# Patient Record
Sex: MALE | Race: WHITE
[De-identification: names, ages, dates, MRNs, and addresses within clinical notes are randomized per-mention and may not be internally consistent; named-entity substitution may affect disease eponyms.]

---

## 2017-01-16 ENCOUNTER — HOSPITAL ENCOUNTER (EMERGENCY)
Dept: HOSPITAL 17 - PHED | Age: 42
Discharge: HOME | End: 2017-01-16
Payer: COMMERCIAL

## 2017-01-16 VITALS — HEIGHT: 76 IN | WEIGHT: 315 LBS | BODY MASS INDEX: 38.36 KG/M2

## 2017-01-16 VITALS
SYSTOLIC BLOOD PRESSURE: 197 MMHG | OXYGEN SATURATION: 96 % | HEART RATE: 79 BPM | RESPIRATION RATE: 18 BRPM | DIASTOLIC BLOOD PRESSURE: 98 MMHG

## 2017-01-16 VITALS — RESPIRATION RATE: 18 BRPM

## 2017-01-16 VITALS
RESPIRATION RATE: 18 BRPM | DIASTOLIC BLOOD PRESSURE: 125 MMHG | TEMPERATURE: 98.3 F | SYSTOLIC BLOOD PRESSURE: 187 MMHG | HEART RATE: 94 BPM | OXYGEN SATURATION: 98 %

## 2017-01-16 DIAGNOSIS — E78.00: ICD-10-CM

## 2017-01-16 DIAGNOSIS — M54.5: Primary | ICD-10-CM

## 2017-01-16 DIAGNOSIS — I10: ICD-10-CM

## 2017-01-16 PROCEDURE — 96372 THER/PROPH/DIAG INJ SC/IM: CPT

## 2017-01-16 PROCEDURE — 99283 EMERGENCY DEPT VISIT LOW MDM: CPT

## 2017-01-16 NOTE — PD
HPI


Chief Complaint:  Musculoskeletal Complaint


Time Seen by Provider:  08:58


Travel History


International Travel<30 days:  No


Contact w/Intl Traveler<30days:  No


Traveled to known affect area:  No





History of Present Illness


HPI


Patient is a 41-year-old male who comes in complaining of lower back pain that 

started yesterday.  He says he has been off from work for the past 2 days and 

has just been resting, so he does not know what is caused his pain.  He says he 

woke up with the pain.  He does have history of low back pain, and works as a 

paramedic lifting patients.  He denies any direct injury to the back.  He 

denies any urinary symptoms.  He denies any numbness or tingling of his legs.





PFSH


Past Medical History


Arthritis:  No


Asthma:  No


Autoimmune Disease:  No


Blood Disorders:  No


Anxiety:  No


Depression:  No


Cancer:  No


Cardiovascular Problems:  Yes (HTN)


High Cholesterol:  Yes


Chemotherapy:  No


Chest Pain:  No


Congestive Heart Failure:  No


COPD:  No


Cerebrovascular Accident:  No


Diabetes:  No


Diminished Hearing:  No


Diverticulitis:  Yes


Endocrine:  No


Gastrointestinal Disorders:  Yes (DIVERTICULITIS)


GERD:  Yes


Glaucoma:  No


Genitourinary:  Yes (CONGENITAL UNDEVELOPED UNILAT. KIDNEY)


Headaches:  No


Hepatitis:  No


Hiatal Hernia:  Yes


Hypertension:  Yes


Immune Disorder:  No


Kidney Stones:  No


Musculoskeletal:  No


Neurologic:  No


Psychiatric:  No


Reproductive:  No


Respiratory:  Yes (SLEEP APNEA/ NO CPAP)


Migraines:  No


Myocardial Infarction:  No


Radiation Therapy:  No


Renal Failure:  No


Seizures:  No


Sickle Cell Disease:  No


Sleep Apnea:  No


Thyroid Disease:  No





Past Surgical History


Abdominal Surgery:  Yes (BOWEL RESECT/ COLOSTOMY with reversal)


AICD:  No


Appendectomy:  No


Arteriovenous Shunt:  No


Cardiac Surgery:  No


Cholecystectomy:  No


Ear Surgery:  No


Endocrine Surgery:  No


Eye Surgery:  No


Genitourinary Surgery:  Yes (RIGHT NEPHRECTOMY (UNDEVELOPED))


Gynecologic Surgery:  No


Joint Replacement:  No


Oral Surgery:  No


Pacemaker:  No


Thoracic Surgery:  No


Other Surgery:  Yes





Social History


Alcohol Use:  Yes (SOCIAL-BEER)


Tobacco Use:  No (QUIT 2005)


Substance Use:  No





Allergies-Medications


(Allergen,Severity, Reaction):  


Coded Allergies:  


     No Known Allergies (Verified , 1/16/17)


Reported Meds & Prescriptions





Reported Meds & Active Scripts


Active


Lortab (Hydrocodone-Acetaminophen) 5-325 Mg Tab 1 Tab PO Q6H PRN


Flexeril (Cyclobenzaprine HCl) 10 Mg Tab 10 Mg PO TID


Ibuprofen 400 Mg Tab 400 Mg PO Q6H PRN


Reported


Hydrochlorothiazide 25 Mg Tab 25 Mg PO DAILY


Cozaar (Losartan Potassium) 50 Mg Tab 50 Mg PO BID








Review of Systems


Except as stated in HPI:  all other systems reviewed are Neg


General / Constitutional:  No: Fever, Chills


HENT:  No: Headaches, Lightheadedness


Cardiovascular:  No: Chest Pain or Discomfort


Respiratory:  No: Shortness of Breath


Gastrointestinal:  No: Nausea, Vomiting


Genitourinary:  No: Dysuria, Flank Pain


Musculoskeletal:  Positive: Pain


Skin:  No Rash, No Change in Pigmentation


Neurologic:  No: Weakness, Dizziness, Sensory Disturbance





Physical Exam


Narrative


GENERAL: Awake and alert in no acute distress.


SKIN: Warm and dry.


HEAD: Atraumatic. Normocephalic. 


EYES: Pupils equal and round. No scleral icterus. 


ENT: Mucous membranes pink and moist.


NECK: Trachea midline. No JVD. 


CARDIOVASCULAR: Regular rate and rhythm.  No murmur appreciated.


RESPIRATORY: No accessory muscle use. Clear to auscultation. Breath sounds 

equal bilaterally. 


MUSCULOSKELETAL: No obvious deformities. No clubbing.  No cyanosis.  No edema.  

Tender to palpation of the lower thoracic spine, tender to the right paraspinal 

muscles.  No CVA tenderness.


NEUROLOGICAL: Awake and alert. No obvious cranial nerve deficits.  Motor 

grossly within normal limits. Normal speech.


PSYCHIATRIC: Appropriate mood and affect; insight and judgment normal.





Data


Data


Last Documented VS





Vital Signs








  Date Time  Temp Pulse Resp B/P Pulse Ox O2 Delivery O2 Flow Rate FiO2


 


1/16/17 10:14   18     


 


1/16/17 10:07  79  197/98 96 Room Air  


 


1/16/17 08:49 98.3       








Orders





 Ketorolac Inj (Toradol Inj) (1/16/17 09:15)


Cyclobenzaprine (Flexeril) (1/16/17 09:15)


Acetamin-Hydrocod 325-5 Mg (Norco  5-325 (1/16/17 10:15)








MDM


Medical Decision Making


Medical Screen Exam Complete:  Yes


Emergency Medical Condition:  Yes


Medical Record Reviewed:  Yes


Differential Diagnosis


Lower back strain versus muscle spasm versus fracture


Narrative Course


Patient is a 41-year-old male who comes in complaining of back pain.  Exam 

shows tenderness to the lower thoracic spine.  There are no neurologic 

symptoms.  Patient given Toradol and Flexeril without relief of symptoms.  

Given a Lortab with some relief of his symptoms.





Patient noted to have elevated blood pressure today.  Advised follow-up with 

his doctor regarding this.  He denies any chest pain or shortness of breath or 

headache at this time.





Patient offered CT scan to further evaluate his pain, however he declines at 

this time.  He says he would like to go home and try and rest and get in a more 

comfortable position.  Patient given prescriptions for ibuprofen, Flexeril, 

Lortab.  Advised not to combine the Flexeril and Lortab.  Advised follow-up 

with a primary care doctor.  Advised to return to the ED as needed for any 

worsening symptoms.





Diagnosis





 Primary Impression:  


 Back pain


 Qualified Code:  M54.5 - Acute midline low back pain without sciatica


Patient Instructions:  Back Pain (ED), General Instructions





***Additional Instructions:


Follow up with a primary care doctor.  Return to the ED as needed for worsening 

symptoms.  Try to stretch and move around to avoid stiffness.  Take pain 

medicine as needed.  Be careful as Flexeril and Lortabs may make you drowsy.


Scripts


Hydrocodone-Acetaminophen (Lortab)5-325 Mg Tab1 Tab PO Q6H PRN (PAIN) #12 TAB  

Ref 0


   Prov:Belkis Schaffer MD         1/16/17 


Cyclobenzaprine (Flexeril)10 Mg Tab10 Mg PO TID  #15 TAB  Ref 0


   Prov:Belkis Schaffer MD         1/16/17 


Ibuprofen 400 Mg Eqs334 Mg PO Q6H PRN (PAIN 1 TO 10 AND/OR AGITATION) #20 TAB  

Ref 0


   Prov:Belkis Schaffer MD         1/16/17


Disposition:  01 DISCHARGE HOME


Condition:  Stable








Belkis Schaffer MD Jan 16, 2017 10:58

## 2017-01-19 ENCOUNTER — HOSPITAL ENCOUNTER (EMERGENCY)
Dept: HOSPITAL 17 - PHED | Age: 42
Discharge: HOME | End: 2017-01-19
Payer: COMMERCIAL

## 2017-01-19 VITALS — WEIGHT: 315 LBS | HEIGHT: 76 IN | BODY MASS INDEX: 38.36 KG/M2

## 2017-01-19 VITALS
RESPIRATION RATE: 20 BRPM | OXYGEN SATURATION: 97 % | DIASTOLIC BLOOD PRESSURE: 125 MMHG | HEART RATE: 101 BPM | SYSTOLIC BLOOD PRESSURE: 186 MMHG | TEMPERATURE: 98.2 F

## 2017-01-19 VITALS — SYSTOLIC BLOOD PRESSURE: 188 MMHG | DIASTOLIC BLOOD PRESSURE: 99 MMHG

## 2017-01-19 VITALS
SYSTOLIC BLOOD PRESSURE: 197 MMHG | HEART RATE: 68 BPM | OXYGEN SATURATION: 98 % | DIASTOLIC BLOOD PRESSURE: 107 MMHG | RESPIRATION RATE: 18 BRPM

## 2017-01-19 DIAGNOSIS — I10: ICD-10-CM

## 2017-01-19 DIAGNOSIS — M54.5: Primary | ICD-10-CM

## 2017-01-19 DIAGNOSIS — K21.9: ICD-10-CM

## 2017-01-19 DIAGNOSIS — E78.00: ICD-10-CM

## 2017-01-19 PROCEDURE — 99283 EMERGENCY DEPT VISIT LOW MDM: CPT

## 2017-01-19 PROCEDURE — 96372 THER/PROPH/DIAG INJ SC/IM: CPT

## 2017-01-19 PROCEDURE — 72131 CT LUMBAR SPINE W/O DYE: CPT

## 2017-01-19 NOTE — PD
HPI


Chief Complaint:  Pain: Acute or Chronic


Time Seen by Provider:  16:18


Travel History


International Travel<30 days:  No


Contact w/Intl Traveler<30days:  No


Traveled to known affect area:  No





History of Present Illness


HPI


This 41-year-old male is complaining of low back pain.  He works as a paramedic 

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  Sunday 

he had a fairly sudden onset of lower back pain.  He was at rest that time.  He 

does not recall any injury or any twisting.  Pain is in the lower back and will 

be admitted to the right side.  It does not radiate down the legs.  There is no 

problem with bowel or bladder control.  He was here 2 days ago and given a 

prescription for Percocet 5.  He has not had any relief





PFSH


Past Medical History


Arthritis:  No


Asthma:  No


Autoimmune Disease:  No


Blood Disorders:  No


Anxiety:  No


Depression:  No


Cancer:  No


Cardiovascular Problems:  Yes (HTN)


High Cholesterol:  Yes


Chemotherapy:  No


Chest Pain:  No


Congestive Heart Failure:  No


COPD:  No


Cerebrovascular Accident:  No


Diabetes:  No


Diminished Hearing:  No


Diverticulitis:  Yes


Endocrine:  No


Gastrointestinal Disorders:  Yes (DIVERTICULITIS)


GERD:  Yes


Glaucoma:  No


Genitourinary:  Yes (CONGENITAL UNDEVELOPED UNILAT. KIDNEY)


Headaches:  No


Hepatitis:  No


Hiatal Hernia:  Yes


Hypertension:  Yes


Immune Disorder:  No


Kidney Stones:  No


Musculoskeletal:  No


Neurologic:  No


Psychiatric:  No


Reproductive:  No


Respiratory:  Yes (SLEEP APNEA/ NO CPAP)


Migraines:  No


Myocardial Infarction:  No


Radiation Therapy:  No


Renal Failure:  No


Seizures:  No


Sickle Cell Disease:  No


Sleep Apnea:  No


Thyroid Disease:  No





Past Surgical History


Abdominal Surgery:  Yes (BOWEL RESECT/ COLOSTOMY with reversal)


AICD:  No


Appendectomy:  No


Arteriovenous Shunt:  No


Cardiac Surgery:  No


Cholecystectomy:  No


Ear Surgery:  No


Endocrine Surgery:  No


Eye Surgery:  No


Genitourinary Surgery:  Yes (RIGHT NEPHRECTOMY (UNDEVELOPED))


Gynecologic Surgery:  No


Joint Replacement:  No


Oral Surgery:  No


Pacemaker:  No


Thoracic Surgery:  No


Other Surgery:  Yes





Social History


Alcohol Use:  Yes (SOCIAL-BEER)


Tobacco Use:  No (QUIT 2005)


Substance Use:  No





Allergies-Medications


(Allergen,Severity, Reaction):  


Coded Allergies:  


     No Known Allergies (Verified , 1/16/17)


Reported Meds & Prescriptions





Reported Meds & Active Scripts


Active


Lortab (Hydrocodone-Acetaminophen) 5-325 Mg Tab 1 Tab PO Q6H PRN


Flexeril (Cyclobenzaprine HCl) 10 Mg Tab 10 Mg PO TID


Ibuprofen 400 Mg Tab 400 Mg PO Q6H PRN


Reported


Hydrochlorothiazide 25 Mg Tab 25 Mg PO DAILY


Cozaar (Losartan Potassium) 50 Mg Tab 50 Mg PO BID








Review of Systems


General / Constitutional:  No: Fever, Chills


HENT:  No: Headaches, Vertigo


Cardiovascular:  No: Chest Pain or Discomfort, Palpitations


Respiratory:  No: Cough, Shortness of Breath


Genitourinary:  No: Urgency, Frequency


Skin:  No Rash


Neurologic:  No: Weakness


Endocrine:  No: Heat Intolerance, Cold Intolerance


Hematologic/Lymphatic:  No: Easy Bruising





Physical Exam


Narrative


GENERAL: He is a very large man.  He has a lot of trouble moving due to pain


SKIN: Warm and dry.


HEAD: Atraumatic. Normocephalic. 


EYES: Pupils equal and round. No scleral icterus. No injection or drainage. 


ENT: No nasal bleeding or discharge.  Mucous membranes pink and moist.


NECK: Trachea midline. No JVD. 


CARDIOVASCULAR: Regular rate and rhythm.  No murmur appreciated.


RESPIRATORY: No accessory muscle use. Clear to auscultation. Breath sounds 

equal bilaterally. 


GASTROINTESTINAL: Abdomen soft, non-tender, nondistended. Hepatic and splenic 

margins not palpable. 


MUSCULOSKELETAL: No obvious deformities. No clubbing.  No cyanosis.  No edema. 


NEUROLOGICAL: Awake and alert. No obvious cranial nerve deficits.  Motor 

grossly within normal limits. Normal speech.  He is tender in the lower back.  

The tenderness is not well localized.  He has good strength in plantar and 

dorsiflexion of the foot.  Sensation of the legs is intact


PSYCHIATRIC: Appropriate mood and affect; insight and judgment normal.





Data


Data


Last Documented VS





Vital Signs








  Date Time  Temp Pulse Resp B/P Pulse Ox O2 Delivery O2 Flow Rate FiO2


 


1/19/17 17:51  68 18 197/107 98 Room Air  


 


1/19/17 16:01 98.2       








Orders





 Ct Lumb Spine W/O Contrast (1/19/17 16:27)


Hydromorphone Pf Inj (Dilaudid Pf Inj) (1/19/17 16:30)


Promethazine Inj (Phenergan Inj) (1/19/17 16:30)








MDM


Medical Decision Making


Medical Screen Exam Complete:  Yes


Emergency Medical Condition:  Yes


Medical Record Reviewed:  Yes


Differential Diagnosis


Differential includes herniated disc, nonspecific back pain,


Narrative Course


CT scan was obtained and shows some bulging of the disc no isaiah herniation.  

Bones are normal.  This gentleman is quite uncomfortable with the pain.  He has 

a prescription for Percocet 5.  He is quite a large man and I will prescribe 

Percocet tens and I told him that he can even take 2 if necessary.





Diagnosis





 Primary Impression:  


 Back pain


 Qualified Code:  M54.5 - Acute midline low back pain without sciatica


Departure Forms:  Tests/Procedures, Work Release   


   Special Instructions:  no work until cleared


Scripts


Oxycodone-Acetaminophen (Percocet) mg Tab1-2 Tab PO Q4H PRN (PAIN) #30 

TAB  Ref 0


   Prov:Lance Sibley MD         1/19/17


Disposition:  01 DISCHARGE HOME


Condition:  Stable








Lance Sibley MD Jan 19, 2017 16:30

## 2017-01-19 NOTE — RADHPO
EXAM DATE/TIME:  01/19/2017 17:27 

 

[facility] COMPARISON:     

No previous studies available for comparison.

 

 

INDICATIONS :     

Lower back pain.

                      

 

RADIATION DOSE:     

48.67 CTDIvol (mGy) 

 

 

 

MEDICAL HISTORY :     

Diverticulitis. Hypertension. 

 

SURGICAL HISTORY :      

Colon resection. Nephrectomy, right.

 

ENCOUNTER:      

Initial

 

ACUITY:      

4 - 6 days

 

PAIN SCALE:      

8/10

 

LOCATION:       

Right  lower back.

 

TECHNIQUE:     

Volumetric scanning of the lumbar spine was performed.  Multiplanar reconstructions in the sagittal, 
coronal and oblique axial planes were performed.  Using automated exposure control and adjustment of 
the mA and/or kV according to patient size, radiation dose was kept as low as reasonably achievable t
o obtain optimal diagnostic quality images. 

 

FINDINGS:       

Study is degraded by streak artifact from the patient's large body habitus.

 

VERTEBRAE:     

Normal vertebral body height.

 

ALIGNMENT:     

No evidence of subluxation.

 

 

T12-L1:  

The thecal sac has a normal diameter.  No evidence of disc bulge or protrusion.  The neural foramina 
are patent bilaterally.

 

L1-L2:    

The thecal sac has a normal diameter.  No evidence of disc bulge or protrusion.  The neural foramina 
are patent bilaterally.

 

L2-L3:    

The thecal sac has a normal diameter.  No evidence of disc bulge or protrusion.  The neural foramina 
are patent bilaterally.

 

L3-L4: 

There is an annular disc bulge with mild flattening the anterior thecal sac and no focal protrusion. 
The neural foramina are patent. There are mild degenerative changes involving the facets. 

 

L4-L5:    

The thecal sac has a normal diameter.  No evidence of disc bulge or protrusion.  The neural foramina 
are patent bilaterally.

 

L5-S1:    

The thecal sac has a normal diameter.  No evidence of disc bulge or protrusion.  The neural foramina 
are patent bilaterally.

 

CONCLUSION:     

1. Suboptimal exam with streak artifact.

2. Annular disc bulge at the L3-4 level with mild mass effect on the sac. No definite focal protrusio
n visualized.

3. Degenerative changes involving the lower facet joints.

 

 

 

 Jimi Ward MD on January 19, 2017 at 18:03           

Board Certified Radiologist.

 This report was verified electronically.

## 2018-05-18 ENCOUNTER — HOSPITAL ENCOUNTER (INPATIENT)
Age: 43
LOS: 2 days | Discharge: HOME | DRG: 687 | End: 2018-05-20

## 2018-05-18 DIAGNOSIS — G47.33: ICD-10-CM

## 2018-05-18 DIAGNOSIS — N17.9: ICD-10-CM

## 2018-05-18 DIAGNOSIS — Z87.718: ICD-10-CM

## 2018-05-18 DIAGNOSIS — E66.01: ICD-10-CM

## 2018-05-18 DIAGNOSIS — Z90.5: ICD-10-CM

## 2018-05-18 DIAGNOSIS — R51: ICD-10-CM

## 2018-05-18 DIAGNOSIS — R31.29: ICD-10-CM

## 2018-05-18 DIAGNOSIS — C64.2: Primary | ICD-10-CM

## 2018-05-18 DIAGNOSIS — K21.9: ICD-10-CM

## 2018-05-18 DIAGNOSIS — Z87.891: ICD-10-CM

## 2018-05-18 DIAGNOSIS — F41.9: ICD-10-CM

## 2018-05-18 DIAGNOSIS — E78.00: ICD-10-CM

## 2018-05-18 DIAGNOSIS — R07.9: ICD-10-CM

## 2018-05-18 DIAGNOSIS — Z80.8: ICD-10-CM

## 2018-05-18 DIAGNOSIS — I10: ICD-10-CM
